# Patient Record
Sex: FEMALE | Race: WHITE | NOT HISPANIC OR LATINO | Employment: FULL TIME | URBAN - METROPOLITAN AREA
[De-identification: names, ages, dates, MRNs, and addresses within clinical notes are randomized per-mention and may not be internally consistent; named-entity substitution may affect disease eponyms.]

---

## 2023-12-19 DIAGNOSIS — F31.9 BIPOLAR DISORDER, UNSPECIFIED (MULTI): ICD-10-CM

## 2023-12-19 DIAGNOSIS — F31.76 BIPOLAR DISORDER, IN FULL REMISSION, MOST RECENT EPISODE DEPRESSED (MULTI): Primary | ICD-10-CM

## 2023-12-19 RX ORDER — LAMOTRIGINE 25 MG/1
TABLET ORAL NIGHTLY
Qty: 90 TABLET | Refills: 1 | OUTPATIENT
Start: 2023-12-19

## 2023-12-20 DIAGNOSIS — F31.70 BIPOLAR I DISORDER IN REMISSION (MULTI): Primary | ICD-10-CM

## 2023-12-20 RX ORDER — LAMOTRIGINE 100 MG/1
100 TABLET ORAL DAILY
Qty: 90 TABLET | Refills: 3 | Status: SHIPPED | OUTPATIENT
Start: 2023-12-20 | End: 2024-04-30 | Stop reason: ALTCHOICE

## 2023-12-20 RX ORDER — LAMOTRIGINE 25 MG/1
25 TABLET ORAL DAILY
Qty: 90 TABLET | Refills: 3 | Status: SHIPPED | OUTPATIENT
Start: 2023-12-20 | End: 2024-04-30 | Stop reason: ALTCHOICE

## 2024-03-26 ENCOUNTER — APPOINTMENT (OUTPATIENT)
Dept: BEHAVIORAL HEALTH | Facility: CLINIC | Age: 69
End: 2024-03-26
Payer: COMMERCIAL

## 2024-03-28 ENCOUNTER — APPOINTMENT (OUTPATIENT)
Dept: BEHAVIORAL HEALTH | Facility: CLINIC | Age: 69
End: 2024-03-28
Payer: COMMERCIAL

## 2024-04-30 ENCOUNTER — OFFICE VISIT (OUTPATIENT)
Dept: BEHAVIORAL HEALTH | Facility: CLINIC | Age: 69
End: 2024-04-30
Payer: COMMERCIAL

## 2024-04-30 VITALS
BODY MASS INDEX: 30.48 KG/M2 | WEIGHT: 172 LBS | HEART RATE: 79 BPM | HEIGHT: 63 IN | TEMPERATURE: 98 F | RESPIRATION RATE: 16 BRPM | DIASTOLIC BLOOD PRESSURE: 75 MMHG | SYSTOLIC BLOOD PRESSURE: 107 MMHG

## 2024-04-30 DIAGNOSIS — F31.70 BIPOLAR I DISORDER IN REMISSION (MULTI): ICD-10-CM

## 2024-04-30 DIAGNOSIS — Z79.899 CONTROLLED SUBSTANCE AGREEMENT SIGNED: ICD-10-CM

## 2024-04-30 PROCEDURE — 80307 DRUG TEST PRSMV CHEM ANLYZR: CPT

## 2024-04-30 PROCEDURE — 99214 OFFICE O/P EST MOD 30 MIN: CPT | Performed by: PSYCHIATRY & NEUROLOGY

## 2024-04-30 RX ORDER — MODAFINIL 100 MG/1
100 TABLET ORAL 2 TIMES DAILY
Qty: 180 TABLET | Refills: 0 | Status: SHIPPED | OUTPATIENT
Start: 2024-04-30 | End: 2024-07-29

## 2024-04-30 RX ORDER — LAMOTRIGINE 200 MG/1
200 TABLET ORAL DAILY
Qty: 90 TABLET | Refills: 3 | Status: SHIPPED | OUTPATIENT
Start: 2024-04-30 | End: 2025-04-30

## 2024-04-30 NOTE — PROGRESS NOTES
"Follow-up visit.   Subjective: She said she felt she need more lamotrigine because she felt a little more depressed for about 6 weeks. She said some people she knew passed away and she was \"forced\" to move to different place without her presence. She said she was grateful for many things because she has been healthy.  She said her depression was several days a week and did not have motivation and pleasure when she was depressed. During the day of depression, she felt depressed most of the time with severity of 4 of 10. Ten was the worst. Not feel hopeless, helpless or worthless. Denied having SI/HI/AVH or paranoia.  Energy was not good almost daily. Concentration was not good almost daily and had to take modafinil more often than before. No change in appetite, but gained 5-6 pounds in 6 weeks. Had problem of falling asleep twice a week. No problem of staying asleep.   Currently taking Lamotrigine 125 mg per day   Modafinil 100 mg PRN, twice a week     Objective:   Appearance: well-groomed.   Demeanor: average.   Eye Contact: average.   Motor Activity: average.   Speech: clear.   Language: Neurologic language is intact.   Fund of Knowledge: intact fund of knowledge.   Delusions: None Reported.   Hallucinations: not reported  Self Harm: None Reported.   Aggressive: None Reported.   Mood: ok  Affect: full with smile   Orientation: alert, oriented x3.   Manner: cooperative.   Thought process: goal-directed.   Thought association: displays rational thought process.   Content of thought: normal  Abstract/ Rational Thought: intact   Memory: grossly intact.   Behavior: normal motor activity, relaxed, good eye contact, calm.   Attention/Concentration: normal.   Cognition: intact.   Intelligence Estimate: average.   Executive Function: intact.   Insight: intact.   Judgement: intact.   Musculoskeletal: normal strength and tone. Normal gait. No abnormal movement  Impression: bipolar I depression, recurrent, mild   ADINA - " manageable most of the time  Discussion/Plan:   Increase lamotrigine to 150 mg for a week, than 200 mg/d    Can take modafinil 100 mg - 200 mg per days   Controlled substance agreement signed  Urine drug screen ordered  Follow-up in 6 months or earlier   Jodi Burroughs MD.

## 2024-05-01 LAB
AMPHETAMINES UR QL SCN: NORMAL
BARBITURATES UR QL SCN: NORMAL
BENZODIAZ UR QL SCN: NORMAL
BZE UR QL SCN: NORMAL
CANNABINOIDS UR QL SCN: NORMAL
FENTANYL+NORFENTANYL UR QL SCN: NORMAL
METHADONE UR QL SCN: NORMAL
OPIATES UR QL SCN: NORMAL
OXYCODONE+OXYMORPHONE UR QL SCN: NORMAL
PCP UR QL SCN: NORMAL

## 2024-11-07 ENCOUNTER — APPOINTMENT (OUTPATIENT)
Dept: BEHAVIORAL HEALTH | Facility: CLINIC | Age: 69
End: 2024-11-07
Payer: COMMERCIAL

## 2024-11-07 VITALS
RESPIRATION RATE: 16 BRPM | SYSTOLIC BLOOD PRESSURE: 123 MMHG | TEMPERATURE: 97.6 F | BODY MASS INDEX: 30.44 KG/M2 | WEIGHT: 171.8 LBS | DIASTOLIC BLOOD PRESSURE: 75 MMHG | HEART RATE: 68 BPM | HEIGHT: 63 IN

## 2024-11-07 DIAGNOSIS — F31.70 BIPOLAR I DISORDER IN REMISSION (MULTI): ICD-10-CM

## 2024-11-07 DIAGNOSIS — F41.1 GAD (GENERALIZED ANXIETY DISORDER): ICD-10-CM

## 2024-11-07 PROCEDURE — 3008F BODY MASS INDEX DOCD: CPT | Performed by: PSYCHIATRY & NEUROLOGY

## 2024-11-07 PROCEDURE — 1159F MED LIST DOCD IN RCRD: CPT | Performed by: PSYCHIATRY & NEUROLOGY

## 2024-11-07 PROCEDURE — 99214 OFFICE O/P EST MOD 30 MIN: CPT | Performed by: PSYCHIATRY & NEUROLOGY

## 2024-11-07 PROCEDURE — 1160F RVW MEDS BY RX/DR IN RCRD: CPT | Performed by: PSYCHIATRY & NEUROLOGY

## 2024-11-07 ASSESSMENT — COLUMBIA-SUICIDE SEVERITY RATING SCALE - C-SSRS
TOTAL  NUMBER OF ABORTED OR SELF INTERRUPTED ATTEMPTS LIFETIME: NO
TOTAL  NUMBER OF INTERRUPTED ATTEMPTS LIFETIME: NO
6. HAVE YOU EVER DONE ANYTHING, STARTED TO DO ANYTHING, OR PREPARED TO DO ANYTHING TO END YOUR LIFE?: NO
1. HAVE YOU WISHED YOU WERE DEAD OR WISHED YOU COULD GO TO SLEEP AND NOT WAKE UP?: NO
2. HAVE YOU ACTUALLY HAD ANY THOUGHTS OF KILLING YOURSELF?: NO
ATTEMPT LIFETIME: NO

## 2024-11-07 NOTE — PROGRESS NOTES
Follow-up visit  Subjective: She said she was doing ok since last visit. She enjoyed her recent vacation in [place] with her friends. She said she enjoyed physical activities. She said she felt disappointed and a little depressed about the results of election. She said otherwise, her mood has been stable. No persistent depression and enjoyed life. She said she need a hip replacement. Overall, she still ate well and slept well. Energy and concentration were fair. She only need take Provigil as needed 2 days a week.  Not feel hopeless, helpless or worthless. Denied having Si/HI/AVH or paranoia. Still felt irritable on and off, but not yell, curse, or scream.   Anxiety - under control most of the time; no panic attack  Took lamotrigine 150 mg per day   Took Provigil as needed   No side effect from medication     No change in medical history  No problem with eyes, ears, nose, or throat  No SOB or chest pain   HTN with medication and under control   Hypercholesterolemia with medication   Heart burn with omeprazole   No  sx  No neurological problem   Left hip needs replacement.   Anemia with B12 injection once a month  No problem with skin  Hypothyroidism with medication again     No new allergies    Objective:   Appearance: well-groomed.   Demeanor: average.   Eye Contact: average.   Motor Activity: average.   Speech: clear.   Language: Neurologic language is intact.   Fund of Knowledge: intact fund of knowledge.   Delusions: None Reported.   Hallucinations: not reported  Self Harm: None Reported.   Aggressive: None Reported.   Mood: good   Affect: full with smile   Orientation: alert, oriented x3.   Manner: cooperative.   Thought process: goal-directed.   Thought association: displays rational thought process.   Content of thought: normal  Abstract/ Rational Thought: intact   Memory: grossly intact.   Behavior: normal motor activity, relaxed, good eye contact, calm.   Attention/Concentration: normal.   Cognition: intact.    Intelligence Estimate: average.   Executive Function: intact.   Insight: intact.   Judgement: intact.   Musculoskeletal: normal strength and tone. Normal gait. No abnormal movement  Impression: bipolar I depression, in remission   ADINA - under control   Discussion/Plan:    Continue current medications   Follow-up 12 months or earlier   Jodi Burroughs MD.

## 2024-12-26 ENCOUNTER — TELEPHONE (OUTPATIENT)
Dept: BEHAVIORAL HEALTH | Facility: CLINIC | Age: 69
End: 2024-12-26
Payer: COMMERCIAL

## 2024-12-27 ENCOUNTER — TELEPHONE (OUTPATIENT)
Dept: BEHAVIORAL HEALTH | Facility: CLINIC | Age: 69
End: 2024-12-27
Payer: COMMERCIAL

## 2024-12-27 DIAGNOSIS — F31.70 BIPOLAR I DISORDER IN REMISSION (MULTI): ICD-10-CM

## 2024-12-27 RX ORDER — LAMOTRIGINE 150 MG/1
150 TABLET ORAL DAILY
Qty: 90 TABLET | Refills: 3 | Status: SHIPPED | OUTPATIENT
Start: 2024-12-27 | End: 2025-12-27

## 2024-12-27 RX ORDER — LAMOTRIGINE 200 MG/1
200 TABLET ORAL DAILY
Qty: 90 TABLET | Refills: 3 | Status: CANCELLED | OUTPATIENT
Start: 2024-12-27 | End: 2025-12-27

## 2025-08-25 ENCOUNTER — TELEPHONE (OUTPATIENT)
Dept: OTHER | Age: 70
End: 2025-08-25
Payer: COMMERCIAL

## 2025-08-25 DIAGNOSIS — F31.70 BIPOLAR I DISORDER IN REMISSION (MULTI): ICD-10-CM

## 2025-08-25 RX ORDER — LAMOTRIGINE 150 MG/1
150 TABLET ORAL DAILY
Qty: 90 TABLET | Refills: 3 | Status: SHIPPED | OUTPATIENT
Start: 2025-08-25 | End: 2026-08-25

## 2025-09-02 ENCOUNTER — TELEPHONE (OUTPATIENT)
Dept: BEHAVIORAL HEALTH | Facility: CLINIC | Age: 70
End: 2025-09-02
Payer: COMMERCIAL

## 2025-09-02 DIAGNOSIS — F31.70 BIPOLAR I DISORDER IN REMISSION (MULTI): ICD-10-CM

## 2025-09-02 RX ORDER — MODAFINIL 100 MG/1
100 TABLET ORAL 2 TIMES DAILY
Qty: 180 TABLET | Refills: 0 | Status: CANCELLED | OUTPATIENT
Start: 2025-09-02 | End: 2025-12-01

## 2025-09-05 ENCOUNTER — TELEPHONE (OUTPATIENT)
Dept: SURGERY | Facility: CLINIC | Age: 70
End: 2025-09-05
Payer: COMMERCIAL

## 2025-09-24 ENCOUNTER — APPOINTMENT (OUTPATIENT)
Dept: SURGERY | Facility: CLINIC | Age: 70
End: 2025-09-24
Payer: COMMERCIAL

## 2025-10-07 ENCOUNTER — APPOINTMENT (OUTPATIENT)
Dept: BEHAVIORAL HEALTH | Facility: CLINIC | Age: 70
End: 2025-10-07
Payer: COMMERCIAL

## 2025-10-13 ENCOUNTER — APPOINTMENT (OUTPATIENT)
Dept: SURGERY | Facility: CLINIC | Age: 70
End: 2025-10-13
Payer: COMMERCIAL

## 2025-10-16 ENCOUNTER — APPOINTMENT (OUTPATIENT)
Dept: BEHAVIORAL HEALTH | Facility: CLINIC | Age: 70
End: 2025-10-16
Payer: COMMERCIAL